# Patient Record
Sex: MALE | Race: WHITE | NOT HISPANIC OR LATINO | Employment: FULL TIME | URBAN - METROPOLITAN AREA
[De-identification: names, ages, dates, MRNs, and addresses within clinical notes are randomized per-mention and may not be internally consistent; named-entity substitution may affect disease eponyms.]

---

## 2017-01-05 ENCOUNTER — OFFICE VISIT (OUTPATIENT)
Dept: CARDIOLOGY | Facility: MEDICAL CENTER | Age: 62
End: 2017-01-05
Payer: COMMERCIAL

## 2017-01-05 VITALS
WEIGHT: 171 LBS | BODY MASS INDEX: 24.48 KG/M2 | SYSTOLIC BLOOD PRESSURE: 110 MMHG | HEART RATE: 80 BPM | HEIGHT: 70 IN | DIASTOLIC BLOOD PRESSURE: 62 MMHG | OXYGEN SATURATION: 92 %

## 2017-01-05 DIAGNOSIS — G47.33 OSA (OBSTRUCTIVE SLEEP APNEA): ICD-10-CM

## 2017-01-05 DIAGNOSIS — I49.9 IRREGULAR HEART BEATS: ICD-10-CM

## 2017-01-05 DIAGNOSIS — R00.2 PALPITATIONS: ICD-10-CM

## 2017-01-05 DIAGNOSIS — E78.5 HYPERLIPIDEMIA, UNSPECIFIED HYPERLIPIDEMIA TYPE: ICD-10-CM

## 2017-01-05 LAB — EKG IMPRESSION: NORMAL

## 2017-01-05 PROCEDURE — 93000 ELECTROCARDIOGRAM COMPLETE: CPT | Performed by: INTERNAL MEDICINE

## 2017-01-05 PROCEDURE — 99204 OFFICE O/P NEW MOD 45 MIN: CPT | Performed by: INTERNAL MEDICINE

## 2017-01-05 NOTE — MR AVS SNAPSHOT
"        Gabriel Cabrera   2017 1:15 PM   Office Visit   MRN: [id number]    Department:  Heart [facility] [place]   Dept Phone:  [phone]    Description:  Male : 1955   Provider:  Mehdi Naik MD,[facility]           Allergies as of 2017     Allergen Noted Reactions    Pcn [Penicillins] 10/24/2012   Rash      You were diagnosed with     Palpitations   [785.1.ICD-9-CM]       Irregular heart beats   [030472]       ERASMO (obstructive sleep apnea)   [257109]   mixed with CSA    Hyperlipidemia, unspecified hyperlipidemia type   [0212887]         Vital Signs     Blood Pressure Pulse Height Weight Body Mass Index Oxygen Saturation    110/62 mmHg 80 1.778 m (5' 10\") 77.565 kg (171 lb) 24.54 kg/m2 92%    Smoking Status                   Never Smoker            Basic Information     Date Of Birth Sex Race Ethnicity Preferred Language    1955 Male White Non- English      Your appointments     2017  2:20 PM   FOLLOW UP with Mehdi Naik MD,[facility] (--)    [address] [place] [address]  [address] [facility] [place] [address]   [phone]              Problem List              ICD-10-CM Priority Class Noted - Resolved    Mixed hyperlipidemia E78.2   2012 - Present    Sleep apnea G47.30   2012 - Present    Other abnormal blood chemistry R79.89   2012 - Present      Health Maintenance        Date Due Completion Dates    COLONOSCOPY 10/1/2005 ---    IMM ZOSTER VACCINE 10/1/2015 ---    IMM INFLUENZA (1) 2016 11/10/2015    IMM DTaP/Tdap/Td Vaccine (2 - Td) 6/10/2025 6/10/2015            Results       Current Immunizations     Influenza Vaccine Quad Inj (Preserved) 11/10/2015    Tdap Vaccine 6/10/2015      Below and/or attached are the medications your provider expects you to take. Review all of your home medications and newly ordered medications with your provider and/or pharmacist. Follow medication instructions as directed by your " provider and/or pharmacist. Please keep your medication list with you and share with your provider. Update the information when medications are discontinued, doses are changed, or new medications (including over-the-counter products) are added; and carry medication information at all times in the event of emergency situations     Allergies:  PCN - Rash               Medications  Valid as of: January 05, 2017 -  1:58 PM    Generic Name Brand Name Tablet Size Instructions for use    Atorvastatin Calcium (Tab) LIPITOR 10 MG Take 1 Tab by mouth every day.        Cholecalciferol   Take  by mouth every day.          Ciclopirox (Solution) PENLAC 8 % Apply daily to affected nail and wipe off with alcohol every 7 days.        Efinaconazole (Solution) Efinaconazole 10 % 1 Application by Apply externally route every day.        Multiple Vitamins-Minerals (Tab) ICAPS  Take  by mouth 2 Times a Day.          NON SPECIFIED   Indications: miles solution for tongue irritation        Red Yeast Rice Extract   Take 1,200 mg by mouth 2 Times a Day.          Saw Palmetto   Take  by mouth every day.          .                 Medicines prescribed today were sent to:     [id number] [address] [address]    Phone: [phone] Fax: [phone]    Open 24 Hours?: No      Medication refill instructions:       If your prescription bottle indicates you have medication refills left, it is not necessary to call your provider’s office. Please contact your pharmacy and they will refill your medication.    If your prescription bottle indicates you do not have any refills left, you may request refills at any time through one of the following ways: The online [employer] system (except Urgent Care), by calling your provider’s office, or by asking your pharmacy to contact your provider’s office with a refill request. Medication refills are processed only during regular business hours  and may not be available until the next business day. Your provider may request additional information or to have a follow-up visit with you prior to refilling your medication.   *Please Note: Medication refills are assigned a new Rx number when refilled electronically. Your pharmacy may indicate that no refills were authorized even though a new prescription for the same medication is available at the pharmacy. Please request the medicine by name with the pharmacy before contacting your provider for a refill.        Your To Do List     Future Labs/Procedures Complete By Expires    ECHOCARDIOGRAM COMP W/O CONT  As directed 1/5/2018    HOLTER MONITOR STUDY  As directed 1/5/2018    TSH  As directed 1/5/2018         [employer] ([employer]) Access Code: [email or web address]  Expires: 2/4/2017  1:58 PM    [employer] (SCTE)  A secure, online tool to manage your health information     [employer]’s [employer] (SCTE)® is a secure, online tool that connects you to your personalized health information from the privacy of your home -- day or night - making it very easy for you to manage your healthcare. Once the activation process is completed, you can even access your medical information using the [employer] (SCTE) makayla, which is available for free in the Apple Makayla store or Google Play store.     [employer] (S[employer]E) provides the following levels of access (as shown below):   My Chart Features   [facility] Doctor [facility]  Specialists [facility]  Urgent  Care [facility]  Primary Care  Doctor   Email your healthcare team securely and privately 24/7 X X X    Manage appointments: schedule your next appointment; view details of past/upcoming appointments X      Request prescription refills. X      View recent personal medical records, including lab and immunizations X X X X   View health record, including health history, allergies, medications X X X X   Read reports about your outpatient visits, procedures, consult and ER notes X X X X   See your discharge summary, which is a recap of  your hospital and/or ER visit that includes your diagnosis, lab results, and care plan. X X       How to register for [employer]:  1. Go to  [email or web address].  2. Click on the Sign Up Now box, which takes you to the New Member Sign Up page. You will need to provide the following information:  a. Enter your [employer] Access Code exactly as it appears at the top of this page. (You will not need to use this code after you’ve completed the sign-up process. If you do not sign up before the expiration date, you must request a new code.)   b. Enter your date of birth.   c. Enter your home email address.   d. Click Submit, and follow the next screen’s instructions.  3. Create a [employer] ID. This will be your [employer] login ID and cannot be changed, so think of one that is secure and easy to remember.  4. Create a [employer] password. You can change your password at any time.  5. Enter your Password Reset Question and Answer. This can be used at a later time if you forget your password.   6. Enter your e-mail address. This allows you to receive e-mail notifications when new information is available in [employer].  7. Click Sign Up. You can now view your health information.    For assistance activating your [employer] account, call [phone]

## 2017-01-05 NOTE — PROGRESS NOTES
Cardiology Consult Note:    Mehdi Naik  Date & Time note created:    1/5/2017   1:43 PM       Patient ID:  Name:             Gabriel Cabrera     YOB: 1955  Age:                 61 y.o.  male   MRN:               [id number]                                                             Chief Complaint: No chief complaint on file.            History of Present Illness:   Gabriel Cabrera has recent fast and irregular heat beats on exertion for 4+ hrs; He registered 140-160 bpm; Still able to function at that HR; Denies other cardiac sx's; High cholesterol and need to increase Statin to high intensity txmt      Review of Systems:     Constitutional: Denies fevers, Denies weight changes  Eyes: Denies changes in vision, no eye pain  Ears/Nose/Throat/Mouth: Denies nasal congestion or sore throat   Cardiovascular: Denies chest pain or palpitations now   Respiratory: Denies shortness of breath , Denies cough  Gastrointestinal/Hepatic: Denies abdominal pain, nausea, vomiting, diarrhea, constipation or GI bleeding   Genitourinary: Denies bladder dysfunction, dysuria or frequency  Musculoskeletal/Rheum: Denies  joint pain and swelling   Skin/Breast: Denies rash, denies breast lumps or discharge  Neurological: Denies headache, confusion, memory loss or focal weakness/parasthesias  Psychiatric: denies mood disorder   Endocrine: Prob MetSyn X; denies hx of diabetes or thyroid dysfunction  Heme/Oncology/Lymph Nodes: Denies enlarged lymph nodes, denies bruising or known bleeding disorder  Allergic/Immunologic: Denies hx of allergies      All other systems were reviewed and are negative (AMA/CMS criteria)    Past Medical History:   Past Medical History   Diagnosis Date   • Skin cancer          Past Surgical History:  History reviewed. No pertinent past surgical history.    Hospital Medications:    Current outpatient prescriptions:   •  atorvastatin (LIPITOR) 10 MG Tab, Take 1 Tab by mouth every day., Disp: 30 Tab,  Rfl: 11  •  SAW PALMETTO, Take  by mouth every day.  , Disp: , Rfl:   •  VITAMIN D, CHOLECALCIFEROL, PO, Take  by mouth every day.  , Disp: , Rfl:   •  Multiple Vitamins-Minerals (ICAPS) TABS, Take  by mouth 2 Times a Day.  , Disp: , Rfl:   •  NON SPECIFIED, Indications: miles solution for tongue irritation, Disp: , Rfl:   •  ciclopirox (PENLAC) 8 % solution, Apply daily to affected nail and wipe off with alcohol every 7 days., Disp: 6.6 mL, Rfl: 3  •  Efinaconazole (JUBLIA) 10 % Solution, 1 Application by Apply externally route every day., Disp: 1 Bottle, Rfl: 1  •  Red Yeast Rice Extract (RED YEAST RICE PO), Take 1,200 mg by mouth 2 Times a Day.  , Disp: , Rfl:     Current Outpatient Medications:  Current Outpatient Prescriptions   Medication Sig Dispense Refill   • atorvastatin (LIPITOR) 10 MG Tab Take 1 Tab by mouth every day. 30 Tab 11   • SAW PALMETTO Take  by mouth every day.       • VITAMIN D, CHOLECALCIFEROL, PO Take  by mouth every day.       • Multiple Vitamins-Minerals (ICAPS) TABS Take  by mouth 2 Times a Day.       • NON SPECIFIED Indications: miles solution for tongue irritation     • ciclopirox (PENLAC) 8 % solution Apply daily to affected nail and wipe off with alcohol every 7 days. 6.6 mL 3   • Efinaconazole (JUBLIA) 10 % Solution 1 Application by Apply externally route every day. 1 Bottle 1   • Red Yeast Rice Extract (RED YEAST RICE PO) Take 1,200 mg by mouth 2 Times a Day.         No current facility-administered medications for this visit.         Medication Allergy/Sensitivities:  Allergies   Allergen Reactions   • Pcn [Penicillins] Rash       Family History:  History reviewed. No pertinent family history.    Social History:  Social History     Social History   • Marital Status:      Spouse Name: N/A   • Number of Children: N/A   • Years of Education: N/A     Occupational History   • Not on file.     Social History Main Topics   • Smoking status: Never Smoker    • Smokeless tobacco:  "Never Used   • Alcohol Use: Yes      Comment: rarely   • Drug Use: No   • Sexual Activity: Not on file     Other Topics Concern   • Not on file     Social History Narrative         Physical Exam:  Vitals  Weight/BMI: Body mass index is 24.54 kg/(m^2).  Blood pressure 110/62, pulse 80, height 1.778 m (5' 10\"), weight 77.565 kg (171 lb), SpO2 92 %.  Filed Vitals:    01/05/17 1323   BP: 110/62   Pulse: 80   Height: 1.778 m (5' 10\")   Weight: 77.565 kg (171 lb)   SpO2: 92%     Oxygen Therapy:  Pulse Oximetry: 92 %  General Appearance:   Well developed, Well nourished, No acute distress, Non-toxic appearance.   HENT:  Normocephalic, Atraumatic, Oropharynx moist mucous membranes, Dentition: Mallampati 4 OP, Nose normal.    Eyes:  PERRLA, EOMI, Conjunctiva normal, No discharge.  Neck:  Normal range of motion, No cervical tenderness, Supple, No stridor, no JVD .  No thyromegaly.  No carotid bruit.  Cardiovascular:  Normal heart rate, Normal rhythm,  S1, S2, no S3,  S4; No gallops; No murmurs, No rubs, .   Extremitites with intact distal pulses, no cyanosis, clubbing or edema.  No heaves, thrills, HJR;  Peripheral pulses: carotid 2+, brachial 2+, radial 2+, ulnar 2+, femoral 2+, popliteal 2+, PT 2+, DP 2+;  Lungs:  Respiratory effort is normal. Normal breath sounds, breath sounds clear to auscultation bilaterally,  no rales, no rhonchi, no wheezing.   Abdomen: Bowel sounds normal, Soft, No tenderness, No guarding, No rebound, No masses, No hepatosplenomegaly.  Skin: Warm, Dry, No erythema, No rash, no induration or crepitus.  Neurologic: Alert & oriented x 3, Normal motor function, Normal sensory function, No focal deficits noted, cranial nerves II through XII are normal,  normal gait.  Psychiatric: Affect normal, Judgment normal, Mood normal.      Data Review:     Records reviewed and summarized in current documentation    Lab Data Review:  Lab Results   Component Value Date/Time    SODIUM 141 11/02/2016 12:00 AM    " POTASSIUM 4.6 11/02/2016 12:00 AM    CHLORIDE 102 11/02/2016 12:00 AM    CO2 22 11/02/2016 12:00 AM    GLUCOSE 96 11/02/2016 12:00 AM    BUN 18 11/02/2016 12:00 AM    CREATININE 0.89 11/02/2016 12:00 AM    BUN-CREATININE RATIO 20 11/02/2016 12:00 AM      No results found for: PROTHROMBTM, INR   Lab Results   Component Value Date/Time    WBC 5.9 11/02/2016 12:00 AM    RBC 4.71 11/02/2016 12:00 AM    HEMOGLOBIN 15.0 11/02/2016 12:00 AM    HEMATOCRIT 45.6 11/02/2016 12:00 AM    MCV 97 11/02/2016 12:00 AM    MCH 31.8 11/02/2016 12:00 AM    MCHC 32.9 11/02/2016 12:00 AM    MPV 10.6* 07/07/2016 02:30 PM    NEUTROPHILS-POLYS 64 11/02/2016 12:00 AM    LYMPHOCYTES 26 11/02/2016 12:00 AM    MONOCYTES 8 11/02/2016 12:00 AM    EOSINOPHILS 2 11/02/2016 12:00 AM    BASOPHILS 0 11/02/2016 12:00 AM        Imaging/Procedures Review:    To my review shows:na    EKG To my review shows: SR 75 bpm, NS IVCD    Assessment and Plan.   61 y.o. male has mixed ERASMO and CSA; not using CPAP--intolerance; Consider AFib as high probability; pls see orders for w/u's;   AHA 10 yr CV risk: 11.8% will use high intensity Statin tx; FLP recheck aims for 50+% LDL reduction from 170    1. Palpitations    - EKG  - HOLTER MONITOR STUDY; Future  - TSH; Future    2. Irregular heart beats    - HOLTER MONITOR STUDY; Future  - TSH; Future    3. ERASMO (obstructive sleep apnea)    - OVERNIGHT PULSE OXIMETRY    4. Hyperlipidemia, unspecified hyperlipidemia type    - LIPID PANEL      1. Palpitations  EKG    HOLTER MONITOR STUDY    TSH   2. Irregular heart beats  HOLTER MONITOR STUDY    TSH   3. ERASMO (obstructive sleep apnea)  OVERNIGHT PULSE OXIMETRY    mixed with CSA   4. Hyperlipidemia, unspecified hyperlipidemia type  LIPID PANEL

## 2017-01-05 NOTE — Clinical Note
[facility]   [address]  [place] [address]  Phone: [phone]  Fax: [phone]              Gabriel Cabrera  1955    Encounter Date: 1/5/2017    Tiffanie Moyer M.D.    Thank you for the referral. I had the pleasure of seeing Gabriel Cabrera today in cardiology clinic. I've attached my visit note below. If you have any questions please feel free to give me a call anytime.      Mehdi Naik MD, PhD, [facility]  Cardiology and Lipidology  [facility]                                                                  PROGRESS NOTE:  Cardiology Consult Note:    Mehdi Naik  Date & Time note created:    1/5/2017   1:43 PM       Patient ID:  Name:             Gabriel Cabrera     YOB: 1955  Age:                 61 y.o.  male   MRN:               [id number]                                                             Chief Complaint: No chief complaint on file.            History of Present Illness:   Gabriel Cabrera has recent fast and irregular heat beats on exertion for 4+ hrs; He registered 140-160 bpm; Still able to function at that HR; Denies other cardiac sx's; High cholesterol and need to increase Statin to high intensity txmt      Review of Systems:     Constitutional: Denies fevers, Denies weight changes  Eyes: Denies changes in vision, no eye pain  Ears/Nose/Throat/Mouth: Denies nasal congestion or sore throat   Cardiovascular: Denies chest pain or palpitations now   Respiratory: Denies shortness of breath , Denies cough  Gastrointestinal/Hepatic: Denies abdominal pain, nausea, vomiting, diarrhea, constipation or GI bleeding   Genitourinary: Denies bladder dysfunction, dysuria or frequency  Musculoskeletal/Rheum: Denies  joint pain and swelling   Skin/Breast: Denies rash, denies breast lumps or discharge  Neurological: Denies headache, confusion, memory loss or focal  weakness/parasthesias  Psychiatric: denies mood disorder   Endocrine: Prob MetSyn X; denies hx of diabetes or thyroid dysfunction  Heme/Oncology/Lymph Nodes: Denies enlarged lymph nodes, denies bruising or known bleeding disorder  Allergic/Immunologic: Denies hx of allergies      All other systems were reviewed and are negative (AMA/CMS criteria)    Past Medical History:   Past Medical History   Diagnosis Date   • Skin cancer          Past Surgical History:  History reviewed. No pertinent past surgical history.    Hospital Medications:    Current outpatient prescriptions:   •  atorvastatin (LIPITOR) 10 MG Tab, Take 1 Tab by mouth every day., Disp: 30 Tab, Rfl: 11  •  SAW PALMETTO, Take  by mouth every day.  , Disp: , Rfl:   •  VITAMIN D, CHOLECALCIFEROL, PO, Take  by mouth every day.  , Disp: , Rfl:   •  Multiple Vitamins-Minerals (ICAPS) TABS, Take  by mouth 2 Times a Day.  , Disp: , Rfl:   •  NON SPECIFIED, Indications: miles solution for tongue irritation, Disp: , Rfl:   •  ciclopirox (PENLAC) 8 % solution, Apply daily to affected nail and wipe off with alcohol every 7 days., Disp: 6.6 mL, Rfl: 3  •  Efinaconazole (JUBLIA) 10 % Solution, 1 Application by Apply externally route every day., Disp: 1 Bottle, Rfl: 1  •  Red Yeast Rice Extract (RED YEAST RICE PO), Take 1,200 mg by mouth 2 Times a Day.  , Disp: , Rfl:     Current Outpatient Medications:  Current Outpatient Prescriptions   Medication Sig Dispense Refill   • atorvastatin (LIPITOR) 10 MG Tab Take 1 Tab by mouth every day. 30 Tab 11   • SAW PALMETTO Take  by mouth every day.       • VITAMIN D, CHOLECALCIFEROL, PO Take  by mouth every day.       • Multiple Vitamins-Minerals (ICAPS) TABS Take  by mouth 2 Times a Day.       • NON SPECIFIED Indications: miles solution for tongue irritation     • ciclopirox (PENLAC) 8 % solution Apply daily to affected nail and wipe off with alcohol every 7 days. 6.6 mL 3   • Efinaconazole (JUBLIA) 10 % Solution 1 Application by  "Apply externally route every day. 1 Bottle 1   • Red Yeast Rice Extract (RED YEAST RICE PO) Take 1,200 mg by mouth 2 Times a Day.         No current facility-administered medications for this visit.         Medication Allergy/Sensitivities:  Allergies   Allergen Reactions   • Pcn [Penicillins] Rash       Family History:  History reviewed. No pertinent family history.    Social History:  Social History     Social History   • Marital Status:      Spouse Name: N/A   • Number of Children: N/A   • Years of Education: N/A     Occupational History   • Not on file.     Social History Main Topics   • Smoking status: Never Smoker    • Smokeless tobacco: Never Used   • Alcohol Use: Yes      Comment: rarely   • Drug Use: No   • Sexual Activity: Not on file     Other Topics Concern   • Not on file     Social History Narrative         Physical Exam:  Vitals  Weight/BMI: Body mass index is 24.54 kg/(m^2).  Blood pressure 110/62, pulse 80, height 1.778 m (5' 10\"), weight 77.565 kg (171 lb), SpO2 92 %.  Filed Vitals:    01/05/17 1323   BP: 110/62   Pulse: 80   Height: 1.778 m (5' 10\")   Weight: 77.565 kg (171 lb)   SpO2: 92%     Oxygen Therapy:  Pulse Oximetry: 92 %  General Appearance:   Well developed, Well nourished, No acute distress, Non-toxic appearance.   HENT:  Normocephalic, Atraumatic, Oropharynx moist mucous membranes, Dentition: Mallampati 4 OP, Nose normal.    Eyes:  PERRLA, EOMI, Conjunctiva normal, No discharge.  Neck:  Normal range of motion, No cervical tenderness, Supple, No stridor, no JVD .  No thyromegaly.  No carotid bruit.  Cardiovascular:  Normal heart rate, Normal rhythm,  S1, S2, no S3,  S4; No gallops; No murmurs, No rubs, .   Extremitites with intact distal pulses, no cyanosis, clubbing or edema.  No heaves, thrills, HJR;  Peripheral pulses: carotid 2+, brachial 2+, radial 2+, ulnar 2+, femoral 2+, popliteal 2+, PT 2+, DP 2+;  Lungs:  Respiratory effort is normal. Normal breath sounds, breath " sounds clear to auscultation bilaterally,  no rales, no rhonchi, no wheezing.   Abdomen: Bowel sounds normal, Soft, No tenderness, No guarding, No rebound, No masses, No hepatosplenomegaly.  Skin: Warm, Dry, No erythema, No rash, no induration or crepitus.  Neurologic: Alert & oriented x 3, Normal motor function, Normal sensory function, No focal deficits noted, cranial nerves II through XII are normal,  normal gait.  Psychiatric: Affect normal, Judgment normal, Mood normal.      Data Review:     Records reviewed and summarized in current documentation    Lab Data Review:  Lab Results   Component Value Date/Time    SODIUM 141 11/02/2016 12:00 AM    POTASSIUM 4.6 11/02/2016 12:00 AM    CHLORIDE 102 11/02/2016 12:00 AM    CO2 22 11/02/2016 12:00 AM    GLUCOSE 96 11/02/2016 12:00 AM    BUN 18 11/02/2016 12:00 AM    CREATININE 0.89 11/02/2016 12:00 AM    BUN-CREATININE RATIO 20 11/02/2016 12:00 AM      No results found for: PROTHROMBTM, INR   Lab Results   Component Value Date/Time    WBC 5.9 11/02/2016 12:00 AM    RBC 4.71 11/02/2016 12:00 AM    HEMOGLOBIN 15.0 11/02/2016 12:00 AM    HEMATOCRIT 45.6 11/02/2016 12:00 AM    MCV 97 11/02/2016 12:00 AM    MCH 31.8 11/02/2016 12:00 AM    MCHC 32.9 11/02/2016 12:00 AM    MPV 10.6* 07/07/2016 02:30 PM    NEUTROPHILS-POLYS 64 11/02/2016 12:00 AM    LYMPHOCYTES 26 11/02/2016 12:00 AM    MONOCYTES 8 11/02/2016 12:00 AM    EOSINOPHILS 2 11/02/2016 12:00 AM    BASOPHILS 0 11/02/2016 12:00 AM        Imaging/Procedures Review:    To my review shows:na    EKG To my review shows: SR 75 bpm, NS IVCD    Assessment and Plan.   61 y.o. male has mixed ERASMO and CSA; not using CPAP--intolerance; Consider AFib as high probability; pls see orders for w/u's;   AHA 10 yr CV risk: 11.8% will use high intensity Statin tx; FLP recheck aims for 50+% LDL reduction from 170    1. Palpitations    - EKG  - HOLTER MONITOR STUDY; Future  - TSH; Future    2. Irregular heart beats    - HOLTER MONITOR STUDY;  Future  - TSH; Future    3. ERASMO (obstructive sleep apnea)    - OVERNIGHT PULSE OXIMETRY    4. Hyperlipidemia, unspecified hyperlipidemia type    - LIPID PANEL      1. Palpitations  EKG    HOLTER MONITOR STUDY    TSH   2. Irregular heart beats  HOLTER MONITOR STUDY    TSH   3. ERASMO (obstructive sleep apnea)  OVERNIGHT PULSE OXIMETRY    mixed with CSA   4. Hyperlipidemia, unspecified hyperlipidemia type  LIPID PANEL         Tiffanie Moyer M.D.  [address]  [place] [address]  VIA Facsimile: [phone]

## 2017-01-06 ENCOUNTER — TELEPHONE (OUTPATIENT)
Dept: CARDIOLOGY | Facility: MEDICAL CENTER | Age: 62
End: 2017-01-06

## 2017-01-17 ENCOUNTER — TELEPHONE (OUTPATIENT)
Dept: CARDIOLOGY | Facility: MEDICAL CENTER | Age: 62
End: 2017-01-17

## 2017-01-17 DIAGNOSIS — E78.2 MIXED HYPERLIPIDEMIA: ICD-10-CM

## 2017-01-17 RX ORDER — ATORVASTATIN CALCIUM 20 MG/1
20 TABLET, FILM COATED ORAL DAILY
Qty: 30 TAB | Refills: 6 | OUTPATIENT
Start: 2017-01-17 | End: 2017-08-23

## 2017-01-17 RX ORDER — ATORVASTATIN CALCIUM 10 MG/1
10 TABLET, FILM COATED ORAL DAILY
Qty: 30 TAB | Refills: 11 | Status: SHIPPED | OUTPATIENT
Start: 2017-01-17 | End: 2017-01-17

## 2017-01-17 NOTE — TELEPHONE ENCOUNTER
To Dr. Naik for review,  Susan MACK RN    ----- Message from Taylor Dhillon sent at 1/17/2017  1:44 PM PST -----  Regarding: questions on medication increase  ERICKA gallagher,    When i spoke with the patient he asked about the increase in his statins.  He said Dr. Naik wanted to increase him to either 30 or 40mg but i can't find that documentation.  Can you please call the patient to clarify.    Thanks.  Natalie

## 2017-01-17 NOTE — TELEPHONE ENCOUNTER
DONE    ----- Message from Mehdi Naik MD,FACC sent at 1/17/2017  6:35 AM PST -----  High ZURDO, OV discussion; Thx  ----- Message -----     From: Susan Angel R.N.     Sent: 1/16/2017   5:06 PM       To: Mehdi Naik MD,FACC    F/V with you 4/18th

## 2017-01-23 ENCOUNTER — TELEPHONE (OUTPATIENT)
Dept: CARDIOLOGY | Facility: MEDICAL CENTER | Age: 62
End: 2017-01-23

## 2017-01-23 NOTE — TELEPHONE ENCOUNTER
----- Message from Lorelei aGrcía, Med Ass't sent at 1/23/2017  3:12 PM PST -----  Regarding: dose clarification  Patient is calling for dose clarifications/directions on his medication (atorvastatin). He can be reached at  [phone] for a call back.       Called and spoke with patient,  He states that he is taking Lipitor 10 mg qd. But was discussed at office visit with Dr. Maria that he would be increasing his statin.    He thinks he may have said increase lipitor to 40 mgs.   Above to Dr. Naik to advise on what dose for him to take.   jlf        Dr. Naik reviews and advises q 3 months if tolerates well with adequate Full lipid panel improvement.  Eventually will try 40-80 mg /day as high intensity.    Advised to have patient get a full lipid panel and come into office for follow-up and discuss.   Left message for patient to call back. jlf      I spoke with Susan Angel RN that did discuss with Dr. Naik on 1/17/17 and was decided that patient increase his Atorvastatin from 10 mg to 20 mg qd.  Patient to repeat Lipid and CMP in 3 months with follow up appt with Dr. Naik in April.   He at that time will discuss increasing the Atorvastatin to 40 or 80 mgs.    Patient states understanding and is thankful for the call and help. jlf

## 2017-01-24 ENCOUNTER — NON-PROVIDER VISIT (OUTPATIENT)
Dept: CARDIOLOGY | Facility: CLINIC | Age: 62
End: 2017-01-24
Payer: COMMERCIAL

## 2017-01-24 DIAGNOSIS — R00.2 PALPITATIONS: ICD-10-CM

## 2017-01-24 DIAGNOSIS — I49.9 IRREGULAR HEART BEAT: ICD-10-CM

## 2017-01-24 DIAGNOSIS — I49.1 PREMATURE ATRIAL CONTRACTION: ICD-10-CM

## 2017-02-03 DIAGNOSIS — R00.2 PALPITATIONS: ICD-10-CM

## 2017-02-03 DIAGNOSIS — I49.9 IRREGULAR HEART BEATS: ICD-10-CM

## 2017-02-03 LAB — EKG IMPRESSION: NORMAL

## 2017-02-03 PROCEDURE — 93224 XTRNL ECG REC UP TO 48 HRS: CPT | Performed by: INTERNAL MEDICINE

## 2017-04-11 LAB
CHOLEST SERPL-MCNC: 170 MG/DL (ref 100–199)
COMMENT 011824: NORMAL
HDLC SERPL-MCNC: 58 MG/DL
LDLC SERPL CALC-MCNC: 95 MG/DL (ref 0–99)
TRIGL SERPL-MCNC: 83 MG/DL (ref 0–149)
TSH SERPL DL<=0.005 MIU/L-ACNC: 1.91 UIU/ML (ref 0.45–4.5)
VLDLC SERPL CALC-MCNC: 17 MG/DL (ref 5–40)

## 2017-04-18 ENCOUNTER — OFFICE VISIT (OUTPATIENT)
Dept: CARDIOLOGY | Facility: CLINIC | Age: 62
End: 2017-04-18
Payer: COMMERCIAL

## 2017-04-18 VITALS
HEART RATE: 88 BPM | DIASTOLIC BLOOD PRESSURE: 70 MMHG | OXYGEN SATURATION: 93 % | SYSTOLIC BLOOD PRESSURE: 110 MMHG | WEIGHT: 163 LBS | BODY MASS INDEX: 23.34 KG/M2 | HEIGHT: 70 IN

## 2017-04-18 DIAGNOSIS — R00.2 PALPITATIONS: ICD-10-CM

## 2017-04-18 DIAGNOSIS — I49.9 IRREGULAR HEART BEATS: ICD-10-CM

## 2017-04-18 DIAGNOSIS — E78.2 MIXED HYPERLIPIDEMIA: ICD-10-CM

## 2017-04-18 PROCEDURE — 99214 OFFICE O/P EST MOD 30 MIN: CPT | Performed by: INTERNAL MEDICINE

## 2017-04-18 NOTE — PROGRESS NOTES
Chief Complaint   Patient presents with   • Follow-Up       This patient is an established male who is here today to discuss:  Recheck palpitation/irreg heart beats; FL:P to lower CV risk on Lipitor 20 mg/d    Patient Active Problem List    Diagnosis Date Noted   • Mixed hyperlipidemia 12/12/2012   • Sleep apnea 12/12/2012   • Other abnormal blood chemistry 12/12/2012       Past Medical History   Diagnosis Date   • Skin cancer      History reviewed. No pertinent past surgical history.    Current Outpatient Prescriptions   Medication Sig Dispense Refill   • atorvastatin (LIPITOR) 10 MG Tab   10   • atorvastatin (LIPITOR) 20 MG Tab Take 1 Tab by mouth every day. 30 Tab 6   • ciclopirox (PENLAC) 8 % solution Apply daily to affected nail and wipe off with alcohol every 7 days. 6.6 mL 3   • SAW PALMETTO Take  by mouth every day.       • VITAMIN D, CHOLECALCIFEROL, PO Take  by mouth every day.       • Multiple Vitamins-Minerals (ICAPS) TABS Take  by mouth 2 Times a Day.       • NON SPECIFIED Take 20 mg by mouth after meals and at bedtime as needed. Indications: miles solution for tongue irritation 90 Each 3   • Efinaconazole (JUBLIA) 10 % Solution 1 Application by Apply externally route every day. 1 Bottle 1   • Red Yeast Rice Extract (RED YEAST RICE PO) Take 1,200 mg by mouth 2 Times a Day.         No current facility-administered medications for this visit.     Pcn      Review of Systems:     Constitutional: Denies fevers, Denies weight changes  Eyes: Denies changes in vision, no eye pain  Ears/Nose/Throat/Mouth: Denies nasal congestion or sore throat   Cardiovascular: Denies chest pain or palpitations   Respiratory: Denies shortness of breath , Denies cough  Gastrointestinal/Hepatic: Denies abdominal pain, nausea, vomiting, diarrhea, constipation or GI bleeding   Genitourinary: Denies bladder dysfunction, dysuria or frequency  Musculoskeletal/Rheum: Denies  joint pain and swelling   Skin/Breast: Denies rash, denies  "breast lumps or discharge  Neurological: Denies headache, confusion, memory loss or focal weakness/parasthesias  Psychiatric: denies mood disorder   Endocrine: denies hx of diabetes or thyroid dysfunction  Heme/Oncology/Lymph Nodes: Denies enlarged lymph nodes, denies brusing or known bleeding disorder  Allergic/Immunologic: Denies hx of allergies      All other systems were reviewed and are negative (AMA/CMS criteria)      Blood pressure 110/70, pulse 88, height 1.778 m (5' 10\"), weight 73.936 kg (163 lb), SpO2 93 %.  General Appearance:   Well developed, Well nourished, No acute distress, Non-toxic appearance.   HENT:  Normocephalic, Atraumatic, Oropharynx moist mucous membranes, Dentition: Mallampati 4 OP, Nose normal.    Eyes:  PERRLA, EOMI, Conjunctiva normal, No discharge.  Neck:  Normal range of motion, No cervical tenderness, Supple, No stridor, no JVD .  No thyromegaly.  No carotid bruit.  Cardiovascular:  Normal heart rate, Normal rhythm,  S1, S2, no S3,  S4; No gallops; No murmurs, No rubs, .   Extremitites with intact distal pulses, no cyanosis, clubbing or edema.  No heaves, thrills, HJR;  Peripheral pulses: carotid 2+, brachial 2+, radial 2+, ulnar 2+, femoral 2+, popliteal 2+, PT 2+, DP 2+;  Lungs:  Respiratory effort is normal. Normal breath sounds, breath sounds clear to auscultation bilaterally,  no rales, no rhonchi, no wheezing.   Abdomen: Bowel sounds normal, Soft, No tenderness, No guarding, No rebound, No masses, No hepatosplenomegaly.  Skin: Warm, Dry, No erythema, No rash, no induration or crepitus.  Neurologic: Alert & oriented x 3, Normal motor function, Normal sensory function, No focal deficits noted, cranial nerves II through XII are normal,  normal gait.  Psychiatric: Affect normal, Judgment normal, Mood normal    Results for WILMA COX (MRN [id number]) as of 4/18/2017 14:34   Ref. Range 7/7/2016 14:30 11/2/2016 00:00 11/23/2016 16:30 1/5/2017 14:21 1/24/2017 13:30 2/7/2017 00:00 " 3/13/2017 14:43 3/13/2017 14:51 4/10/2017 08:40   Glycohemoglobin Unknown   5.6         Cholesterol,Tot Latest Ref Range: 100-199 mg/dL 246 (H) 217 (H)       170   Triglycerides Latest Ref Range: 0-149 mg/dL 61 66       83   HDL Latest Ref Range: >39 mg/dL 64.0 (H) 68       58   Non HDL Cholesterol Latest Ref Range:   182 (H)           LDL Latest Ref Range: 0-99 mg/dL 170 (H) 136 (H)       95   Chol-Hdl Ratio Unknown 3.84           VLDL Cholesterol Calc Latest Ref Range: 5-40 mg/dL  13       17   Comment: Unknown  CANCELED       CANCELED     Assessment and Plan.   61 y.o. male has no AFib/VT on Holter, reassured; FLP has greatly improved, will recheck in 6 months before increase to Lipitor 40 mg/d  OPO discussed w/o sig hypoxemia but ZURDO 18/hr on his stomch position sleeping        1. Palpitations     2. Irregular heart beats     3. Mixed hyperlipidemia

## 2017-04-18 NOTE — MR AVS SNAPSHOT
"        Gabriel Cabrera   2017 2:20 PM   Office Visit   MRN: [id number]    [facility]   Dept Phone:  [phone]    Description:  Male : 1955   Provider:  Mehdi Naik MD,[facility]           Reason for Visit     Follow-Up           Allergies as of 2017     Allergen Noted Reactions    Pcn [Penicillins] 10/24/2012   Rash      You were diagnosed with     Palpitations   [785.1.ICD-9-CM]       Irregular heart beats   [169568]       Mixed hyperlipidemia   [272.2.ICD-9-CM]         Vital Signs     Blood Pressure Pulse Height Weight Body Mass Index Oxygen Saturation    110/70 mmHg 88 1.778 m (5' 10\") 73.936 kg (163 lb) 23.39 kg/m2 93%    Smoking Status                   Never Smoker            Basic Information     Date Of Birth Sex Race Ethnicity Preferred Language    1955 Male White Non- English      Your appointments     Aug 23, 2017  3:00 PM   30 Minute with Tiffanie Moyer M.D.   [facility] (--)    [address] [place] [address] [address] [place] [address]   [phone]            Oct 12, 2017  3:00 PM   FOLLOW UP with [facility] (--)    [address] [place] [address]  2[address] [facility] [address] [phone]              Problem List              ICD-10-CM Priority Class Noted - Resolved    Mixed hyperlipidemia E78.2   2012 - Present    Sleep apnea G47.30   2012 - Present    Other abnormal blood chemistry R79.89   2012 - Present      Health Maintenance        Date Due Completion Dates    COLONOSCOPY 10/1/2005 ---    IMM ZOSTER VACCINE 10/1/2015 ---    IMM DTaP/Tdap/Td Vaccine (2 - Td) 6/10/2025 6/10/2015            Current Immunizations     Influenza Vaccine Quad Inj (Preserved) 11/10/2015    Tdap Vaccine 6/10/2015      Below and/or attached are the medications your provider expects you to take. Review all of your home medications and newly ordered medications with your provider and/or " pharmacist. Follow medication instructions as directed by your provider and/or pharmacist. Please keep your medication list with you and share with your provider. Update the information when medications are discontinued, doses are changed, or new medications (including over-the-counter products) are added; and carry medication information at all times in the event of emergency situations     Allergies:  PCN - Rash               Medications  Valid as of: April 18, 2017 -  2:51 PM    Generic Name Brand Name Tablet Size Instructions for use    Atorvastatin Calcium (Tab) LIPITOR 20 MG Take 1 Tab by mouth every day.        Atorvastatin Calcium (Tab) LIPITOR 10 MG         Cholecalciferol   Take  by mouth every day.          Ciclopirox (Solution) PENLAC 8 % Apply daily to affected nail and wipe off with alcohol every 7 days.        Efinaconazole (Solution) Efinaconazole 10 % 1 Application by Apply externally route every day.        Multiple Vitamins-Minerals (Tab) ICAPS  Take  by mouth 2 Times a Day.          NON SPECIFIED   Take 20 mg by mouth after meals and at bedtime as needed. Indications: miles solution for tongue irritation        Red Yeast Rice Extract   Take 1,200 mg by mouth 2 Times a Day.          Saw Palmetto   Take  by mouth every day.          .                 Medicines prescribed today were sent to:     [facility] [address] [address]    Phone: [phone] Fax: [phone]    Open 24 Hours?: No      Medication refill instructions:       If your prescription bottle indicates you have medication refills left, it is not necessary to call your provider’s office. Please contact your pharmacy and they will refill your medication.    If your prescription bottle indicates you do not have any refills left, you may request refills at any time through one of the following ways: The online [employer] system (except Urgent Care), by calling your  provider’s office, or by asking your pharmacy to contact your provider’s office with a refill request. Medication refills are processed only during regular business hours and may not be available until the next business day. Your provider may request additional information or to have a follow-up visit with you prior to refilling your medication.   *Please Note: Medication refills are assigned a new Rx number when refilled electronically. Your pharmacy may indicate that no refills were authorized even though a new prescription for the same medication is available at the pharmacy. Please request the medicine by name with the pharmacy before contacting your provider for a refill.           Scaffold Access Code: [email or web address]  Expires: 5/18/2017  2:51 PM    Scaffold  A secure, online tool to manage your health information     [employer]’s Scaffold® is a secure, online tool that connects you to your personalized health information from the privacy of your home -- day or night - making it very easy for you to manage your healthcare. Once the activation process is completed, you can even access your medical information using the Scaffold makayla, which is available for free in the Apple Mak[employer] store or Google Play store.     Scaffold provides the following levels of access (as shown below):   My Chart Features   [facility] Doctor [facility]  Specialists [facility]  Urgent  Care Non-[facility]  Primary Care  Doctor   Email your healthcare team securely and privately 24/7 X X X    Manage appointments: schedule your next appointment; view details of past/upcoming appointments X      Request prescription refills. X      View recent personal medical records, including lab and immunizations X X X X   View health record, including health history, allergies, medications X X X X   Read reports about your outpatient visits, procedures, consult and ER notes X X X X   See your discharge summary, which is a recap of your hospital and/or ER  visit that includes your diagnosis, lab results, and care plan. X X       How to register for [facility]:  1. Go to  [email or web address].  2. Click on the Sign Up Now box, which takes you to the New Member Sign Up page. You will need to provide the following information:  a. Enter your [facility] Access Code exactly as it appears at the top of this page. (You will not need to use this code after you’ve completed the sign-up process. If you do not sign up before the expiration date, you must request a new code.)   b. Enter your date of birth.   c. Enter your home email address.   d. Click Submit, and follow the next screen’s instructions.  3. Create a [employer] ID. This will be your [employer] login ID and cannot be changed, so think of one that is secure and easy to remember.  4. Create a [employer] password. You can change your password at any time.  5. Enter your Password Reset Question and Answer. This can be used at a later time if you forget your password.   6. Enter your e-mail address. This allows you to receive e-mail notifications when new information is available in [facility].  7. Click Sign Up. You can now view your health information.    For assistance activating your [facility] account, call [phone]

## 2017-04-18 NOTE — Clinical Note
[facility] [address],   [address]  [place] [address]  Phone: [phone]  Fax: [phone]              Gabriel Cabrera  1955    Encounter Date: 4/18/2017    Tiffanie Moyer M.D.    Thank you for the referral. I had the pleasure of seeing Gabriel Cabrera today in cardiology clinic. I've attached my visit note below. If you have any questions please feel free to give me a call anytime.      Mehdi Naik MD, PhD, [facility]  Cardiology and Lipidology  [facility]                                                                  PROGRESS NOTE:  Chief Complaint   Patient presents with   • Follow-Up       This patient is an established male who is here today to discuss:  Recheck palpitation/irreg heart beats; FL:P to lower CV risk on Lipitor 20 mg/d    Patient Active Problem List    Diagnosis Date Noted   • Mixed hyperlipidemia 12/12/2012   • Sleep apnea 12/12/2012   • Other abnormal blood chemistry 12/12/2012       Past Medical History   Diagnosis Date   • Skin cancer      History reviewed. No pertinent past surgical history.    Current Outpatient Prescriptions   Medication Sig Dispense Refill   • atorvastatin (LIPITOR) 10 MG Tab   10   • atorvastatin (LIPITOR) 20 MG Tab Take 1 Tab by mouth every day. 30 Tab 6   • ciclopirox (PENLAC) 8 % solution Apply daily to affected nail and wipe off with alcohol every 7 days. 6.6 mL 3   • SAW PALMETTO Take  by mouth every day.       • VITAMIN D, CHOLECALCIFEROL, PO Take  by mouth every day.       • Multiple Vitamins-Minerals (ICAPS) TABS Take  by mouth 2 Times a Day.       • NON SPECIFIED Take 20 mg by mouth after meals and at bedtime as needed. Indications: miles solution for tongue irritation 90 Each 3   • Efinaconazole (JUBLIA) 10 % Solution 1 Application by Apply externally route every day. 1 Bottle 1   • Red Yeast Rice Extract (RED YEAST RICE PO) Take 1,200 mg by  "mouth 2 Times a Day.         No current facility-administered medications for this visit.     Pcn      Review of Systems:     Constitutional: Denies fevers, Denies weight changes  Eyes: Denies changes in vision, no eye pain  Ears/Nose/Throat/Mouth: Denies nasal congestion or sore throat   Cardiovascular: Denies chest pain or palpitations   Respiratory: Denies shortness of breath , Denies cough  Gastrointestinal/Hepatic: Denies abdominal pain, nausea, vomiting, diarrhea, constipation or GI bleeding   Genitourinary: Denies bladder dysfunction, dysuria or frequency  Musculoskeletal/Rheum: Denies  joint pain and swelling   Skin/Breast: Denies rash, denies breast lumps or discharge  Neurological: Denies headache, confusion, memory loss or focal weakness/parasthesias  Psychiatric: denies mood disorder   Endocrine: denies hx of diabetes or thyroid dysfunction  Heme/Oncology/Lymph Nodes: Denies enlarged lymph nodes, denies brusing or known bleeding disorder  Allergic/Immunologic: Denies hx of allergies      All other systems were reviewed and are negative (AMA/CMS criteria)      Blood pressure 110/70, pulse 88, height 1.778 m (5' 10\"), weight 73.936 kg (163 lb), SpO2 93 %.  General Appearance:   Well developed, Well nourished, No acute distress, Non-toxic appearance.   HENT:  Normocephalic, Atraumatic, Oropharynx moist mucous membranes, Dentition: Mallampati 4 OP, Nose normal.    Eyes:  PERRLA, EOMI, Conjunctiva normal, No discharge.  Neck:  Normal range of motion, No cervical tenderness, Supple, No stridor, no JVD .  No thyromegaly.  No carotid bruit.  Cardiovascular:  Normal heart rate, Normal rhythm,  S1, S2, no S3,  S4; No gallops; No murmurs, No rubs, .   Extremitites with intact distal pulses, no cyanosis, clubbing or edema.  No heaves, thrills, HJR;  Peripheral pulses: carotid 2+, brachial 2+, radial 2+, ulnar 2+, femoral 2+, popliteal 2+, PT 2+, DP 2+;  Lungs:  Respiratory effort is normal. Normal breath sounds, " breath sounds clear to auscultation bilaterally,  no rales, no rhonchi, no wheezing.   Abdomen: Bowel sounds normal, Soft, No tenderness, No guarding, No rebound, No masses, No hepatosplenomegaly.  Skin: Warm, Dry, No erythema, No rash, no induration or crepitus.  Neurologic: Alert & oriented x 3, Normal motor function, Normal sensory function, No focal deficits noted, cranial nerves II through XII are normal,  normal gait.  Psychiatric: Affect normal, Judgment normal, Mood normal    Results for WILMA COX (MRN [id number]) as of 4/18/2017 14:34   Ref. Range 7/7/2016 14:30 11/2/2016 00:00 11/23/2016 16:30 1/5/2017 14:21 1/24/2017 13:30 2/7/2017 00:00 3/13/2017 14:43 3/13/2017 14:51 4/10/2017 08:40   Glycohemoglobin Unknown   5.6         Cholesterol,Tot Latest Ref Range: 100-199 mg/dL 246 (H) 217 (H)       170   Triglycerides Latest Ref Range: 0-149 mg/dL 61 66       83   HDL Latest Ref Range: >39 mg/dL 64.0 (H) 68       58   Non HDL Cholesterol Latest Ref Range:   182 (H)           LDL Latest Ref Range: 0-99 mg/dL 170 (H) 136 (H)       95   Chol-Hdl Ratio Unknown 3.84           VLDL Cholesterol Calc Latest Ref Range: 5-40 mg/dL  13       17   Comment: Unknown  CANCELED       CANCELED     Assessment and Plan.   61 y.o. male has no AFib/VT on Holter, reassured; FLP has greatly improved, will recheck in 6 months before increase to Lipitor 40 mg/d  OPO discussed w/o sig hypoxemia but ZURDO 18/hr on his stomch position sleeping        1. Palpitations     2. Irregular heart beats     3. Mixed hyperlipidemia               Tiffanie Moyer M.D.  [address] [facility] [address]  [place] [address]  VIA Facsimile: [phone]

## 2017-08-15 PROBLEM — R97.20 ELEVATED PROSTATE SPECIFIC ANTIGEN (PSA): Status: ACTIVE | Noted: 2017-08-15

## 2017-10-13 LAB
CHOLEST SERPL-MCNC: 183 MG/DL (ref 100–199)
COMMENT 011824: ABNORMAL
HDLC SERPL-MCNC: 57 MG/DL
LDLC SERPL CALC-MCNC: 110 MG/DL (ref 0–99)
TRIGL SERPL-MCNC: 81 MG/DL (ref 0–149)
VLDLC SERPL CALC-MCNC: 16 MG/DL (ref 5–40)

## 2017-10-19 ENCOUNTER — OFFICE VISIT (OUTPATIENT)
Dept: CARDIOLOGY | Facility: CLINIC | Age: 62
End: 2017-10-19
Payer: COMMERCIAL

## 2017-10-19 VITALS
OXYGEN SATURATION: 94 % | WEIGHT: 166 LBS | BODY MASS INDEX: 23.77 KG/M2 | SYSTOLIC BLOOD PRESSURE: 110 MMHG | HEART RATE: 70 BPM | DIASTOLIC BLOOD PRESSURE: 60 MMHG | HEIGHT: 70 IN

## 2017-10-19 DIAGNOSIS — G47.33 OSA (OBSTRUCTIVE SLEEP APNEA): ICD-10-CM

## 2017-10-19 DIAGNOSIS — I49.9 IRREGULAR HEART BEATS: ICD-10-CM

## 2017-10-19 DIAGNOSIS — E78.2 MIXED HYPERLIPIDEMIA: ICD-10-CM

## 2017-10-19 DIAGNOSIS — R00.2 PALPITATIONS: ICD-10-CM

## 2017-10-19 PROCEDURE — 99214 OFFICE O/P EST MOD 30 MIN: CPT | Performed by: INTERNAL MEDICINE

## 2017-10-19 RX ORDER — MULTIVIT WITH MINERALS/LUTEIN
1 TABLET ORAL DAILY
COMMUNITY
End: 2021-03-03

## 2017-10-19 RX ORDER — UBIDECARENONE 75 MG
5000 CAPSULE ORAL DAILY
COMMUNITY

## 2017-10-19 RX ORDER — ASCORBIC ACID 500 MG
500 TABLET ORAL DAILY
COMMUNITY
End: 2022-11-17

## 2017-10-19 RX ORDER — ATORVASTATIN CALCIUM 40 MG/1
40 TABLET, FILM COATED ORAL DAILY
Qty: 90 TAB | Refills: 3 | Status: SHIPPED | OUTPATIENT
Start: 2017-10-19 | End: 2018-11-06 | Stop reason: SDUPTHER

## 2017-10-19 NOTE — PROGRESS NOTES
Chief Complaint   Patient presents with   • Follow-Up     Palpitation, HLD    This patient is an established male who is here today to discuss:  Palpitation/irreg, irreg  heart beats; FL:P to lower CV risk on Lipitor 20 mg/d    Patient Active Problem List    Diagnosis Date Noted   • Elevated prostate specific antigen (PSA) 08/15/2017   • Mixed hyperlipidemia 12/12/2012   • Sleep apnea 12/12/2012   • Other abnormal blood chemistry 12/12/2012       Past Medical History:   Diagnosis Date   • Skin cancer      No past surgical history on file.  Social History     Social History   • Marital status:      Spouse name: N/A   • Number of children: N/A   • Years of education: N/A     Social History Main Topics   • Smoking status: Never Smoker   • Smokeless tobacco: Never Used   • Alcohol use 0.0 oz/week      Comment: rarely   • Drug use: No   • Sexual activity: Not on file     Other Topics Concern   • Not on file     Social History Narrative   • No narrative on file     No family history on file.    Current Outpatient Prescriptions   Medication Sig Dispense Refill   • atorvastatin (LIPITOR) 20 MG Tab TAKE ONE TABLET BY MOUTH ONCE DAILY 30 Tab 6   • ciclopirox (PENLAC) 8 % solution Apply daily to affected nail and wipe off with alcohol every 7 days. 6.6 mL 3   • Red Yeast Rice Extract (RED YEAST RICE PO) Take 1,200 mg by mouth 2 Times a Day.       • SAW PALMETTO Take  by mouth every day.       • VITAMIN D, CHOLECALCIFEROL, PO Take  by mouth every day.       • Multiple Vitamins-Minerals (ICAPS) TABS Take  by mouth 2 Times a Day.         No current facility-administered medications for this visit.      Pcn [penicillins]    Review of Systems:     Constitutional: Denies fevers, Denies weight changes  Eyes: Denies changes in vision, no eye pain  Ears/Nose/Throat/Mouth: Denies nasal congestion or sore throat   Cardiovascular: Denies chest pain but palpitations   Respiratory: Denies shortness of breath , Denies  "cough  Gastrointestinal/Hepatic: Denies abdominal pain, nausea, vomiting, diarrhea, constipation or GI bleeding   Genitourinary: Denies bladder dysfunction, dysuria or frequency  Musculoskeletal/Rheum: Denies  joint pain and swelling   Skin/Breast: Denies rash, denies breast lumps or discharge  Neurological: Denies headache, confusion, memory loss or focal weakness/parasthesias  Psychiatric: denies mood disorder   Endocrine: denies hx of diabetes or thyroid dysfunction  Heme/Oncology/Lymph Nodes: Denies enlarged lymph nodes, denies brusing or known bleeding disorder  Allergic/Immunologic:  hx of allergies      All other systems were reviewed and are negative (AMA/CMS criteria)      Blood pressure 110/60, pulse 70, height 1.778 m (5' 10\"), weight 75.3 kg (166 lb), SpO2 94 %.  General Appearance:   Well developed, Well nourished, No acute distress, Non-toxic appearance.   HENT:  Normocephalic, Atraumatic, Oropharynx moist mucous membranes, Dentition: Mallampati 4 OP, Nose normal.    Eyes:  PERRLA, EOMI, Conjunctiva normal, No discharge.  Neck:  Normal range of motion, No cervical tenderness, Supple, No stridor, no JVD .  No thyromegaly.  No carotid bruit.  Cardiovascular:  Normal heart rate, Normal rhythm,  S1, S2, no S3,  S4; No gallops; No murmurs, No rubs, .   Extremitites with intact distal pulses, no cyanosis, clubbing or edema.  No heaves, thrills, HJR;  Peripheral pulses: carotid 2+, brachial 2+, radial 2+, ulnar 2+, femoral 2+, popliteal 2+, PT 2+, DP 2+;  Lungs:  Respiratory effort is normal. Normal breath sounds, breath sounds clear to auscultation bilaterally,  no rales, no rhonchi, no wheezing.   Abdomen: Bowel sounds normal, Soft, No tenderness, No guarding, No rebound, No masses, No hepatosplenomegaly.  Skin: Warm, Dry, No erythema, No rash, no induration or crepitus.  Neurologic: Alert & oriented x 3, Normal motor function, Normal sensory function, No focal deficits noted, cranial nerves II through XII " are normal,  normal gait.  Psychiatric: Affect normal, Judgment normal, Mood normal      Results for WILMA COX (MRN [id number]) as of 10/19/2017 14:56   Ref. Range 4/10/2017 08:40 8/18/2017 10:34 10/13/2017 08:33   WBC Latest Ref Range: 3.4 - 10.8 x10E3/uL  5.4    RBC Latest Ref Range: 4.14 - 5.80 x10E6/uL  4.41    Hemoglobin Latest Ref Range: 12.6 - 17.7 g/dL  13.9    Hematocrit Latest Ref Range: 37.5 - 51.0 %  42.6    MCV Latest Ref Range: 79 - 97 fL  97    MCH Latest Ref Range: 26.6 - 33.0 pg  31.5    MCHC Latest Ref Range: 31.5 - 35.7 g/dL  32.6    RDW Latest Ref Range: 12.3 - 15.4 %  13.2    Platelet Count Latest Ref Range: 150 - 379 x10E3/uL  211    Immature Cells Unknown  CANCELED    Neutrophils-Polys Latest Units: %  67    Neutrophils (Absolute) Latest Ref Range: 1.4 - 7.0 x10E3/uL  3.6    Lymphocytes Latest Units: %  25    Lymphs (Absolute) Latest Ref Range: 0.7 - 3.1 x10E3/uL  1.4    Monocytes Latest Units: %  6    Monos (Absolute) Latest Ref Range: 0.1 - 0.9 x10E3/uL  0.4    Eosinophils Latest Units: %  2    Eos (Absolute) Latest Ref Range: 0.0 - 0.4 x10E3/uL  0.1    Basophils Latest Units: %  0    Baso (Absolute) Latest Ref Range: 0.0 - 0.2 x10E3/uL  0.0    Immature Granulocytes Latest Units: %  0    Immature Granulocytes (abs) Latest Ref Range: 0.0 - 0.1 x10E3/uL  0.0    Nucleated RBC Unknown  CANCELED    Comments-Diff Unknown  CANCELED    Sodium Latest Ref Range: 134 - 144 mmol/L  139    Potassium Latest Ref Range: 3.5 - 5.2 mmol/L  4.2    Chloride Latest Ref Range: 96 - 106 mmol/L  103    Co2 Latest Ref Range: 18 - 29 mmol/L  20    Glucose Latest Ref Range: 65 - 99 mg/dL  90    Bun Latest Ref Range: 8 - 27 mg/dL  23    Creatinine Latest Ref Range: 0.76 - 1.27 mg/dL  0.94    GFR If  Latest Ref Range: >59 mL/min/1.73  101    GFR If Non  Latest Ref Range: >59 mL/min/1.73  87    Bun-Creatinine Ratio Latest Ref Range: 10 - 24   24    Calcium Latest Ref Range: 8.6 -  10.2 mg/dL  9.2    AST(SGOT) Latest Ref Range: 0 - 40 IU/L  14    ALT(SGPT) Latest Ref Range: 0 - 44 IU/L  17    Alkaline Phosphatase Latest Ref Range: 39 - 117 IU/L  69    Total Bilirubin Latest Ref Range: 0.0 - 1.2 mg/dL  0.4    Albumin Latest Ref Range: 3.6 - 4.8 g/dL  4.3    Total Protein Latest Ref Range: 6.0 - 8.5 g/dL  6.9    Globulin Latest Ref Range: 1.5 - 4.5 g/dL  2.6    A-G Ratio Latest Ref Range: 1.2 - 2.2   1.7    Cholesterol,Tot Latest Ref Range: 100 - 199 mg/dL 170 185 183   Triglycerides Latest Ref Range: 0 - 149 mg/dL 83 56 81   HDL Latest Ref Range: >39 mg/dL 58 59 57   LDL Latest Ref Range: 0 - 99 mg/dL 95 115 (H) 110 (H)   Chol-Hdl Ratio Latest Ref Range: 0.0 - 5.0 ratio units  3.1    VLDL Cholesterol Calc Latest Ref Range: 5 - 40 mg/dL 17 11 16   Results for WILMA COX (MRN [id number]) as of 10/19/2017 14:56   Ref. Range 3/13/2017 14:51 4/10/2017 08:40 8/18/2017 10:34   Prostatic Specific Antigen Tot Latest Ref Range: 0.0 - 4.0 ng/mL   5.9 (H)   25-Hydroxy   Vitamin D 25 Latest Ref Range: 30.0 - 100.0 ng/mL   30.1   TSH Latest Ref Range: 0.450 - 4.500 uIU/mL  1.910 2.070     Assessment and Plan.   62 y.o. male has no SE from Lipitor and LDL does not seem to respond ; Will increase Lipitor to 40 mg/d and recheck before consider different Statin prep; He is no longer taking Red yeast rice;     1. Palpitations  none    2. Irregular heart beats  none    3. Mixed hyperlipidemia  See above    4. ERASMO (obstructive sleep apnea)  None O2 nor CPAP;       Return to clinic in  4 , months    1. Palpitations     2. Irregular heart beats     3. Mixed hyperlipidemia     4. ERASMO (obstructive sleep apnea)

## 2017-10-19 NOTE — LETTER
[facility] [address],   [address]  [place] [address]  Phone: [phone]  Fax: [phone]              Gabriel Cabrera  1955    Encounter Date: 10/19/2017    Tiffanie Moyer M.D.    Thank you for the referral. I had the pleasure of seeing Gabriel Cabrera today in cardiology clinic. I've attached my visit note below. If you have any questions please feel free to give me a call anytime.      Mehdi Naik MD, PhD, [facility]  Cardiology and Lipidology  [facility]                                                                  PROGRESS NOTE:  Chief Complaint   Patient presents with   • Follow-Up     Palpitation, HLD    This patient is an established male who is here today to discuss:  Palpitation/irreg, irreg  heart beats; FL:P to lower CV risk on Lipitor 20 mg/d    Patient Active Problem List    Diagnosis Date Noted   • Elevated prostate specific antigen (PSA) 08/15/2017   • Mixed hyperlipidemia 12/12/2012   • Sleep apnea 12/12/2012   • Other abnormal blood chemistry 12/12/2012       Past Medical History:   Diagnosis Date   • Skin cancer      No past surgical history on file.  Social History     Social History   • Marital status:      Spouse name: N/A   • Number of children: N/A   • Years of education: N/A     Social History Main Topics   • Smoking status: Never Smoker   • Smokeless tobacco: Never Used   • Alcohol use 0.0 oz/week      Comment: rarely   • Drug use: No   • Sexual activity: Not on file     Other Topics Concern   • Not on file     Social History Narrative   • No narrative on file     No family history on file.    Current Outpatient Prescriptions   Medication Sig Dispense Refill   • atorvastatin (LIPITOR) 20 MG Tab TAKE ONE TABLET BY MOUTH ONCE DAILY 30 Tab 6   • ciclopirox (PENLAC) 8 % solution Apply daily to affected nail and wipe off with alcohol every 7 days. 6.6 mL 3   • Red  "Yeast Rice Extract (RED YEAST RICE PO) Take 1,200 mg by mouth 2 Times a Day.       • SAW PALMETTO Take  by mouth every day.       • VITAMIN D, CHOLECALCIFEROL, PO Take  by mouth every day.       • Multiple Vitamins-Minerals (ICAPS) TABS Take  by mouth 2 Times a Day.         No current facility-administered medications for this visit.      Pcn [penicillins]    Review of Systems:     Constitutional: Denies fevers, Denies weight changes  Eyes: Denies changes in vision, no eye pain  Ears/Nose/Throat/Mouth: Denies nasal congestion or sore throat   Cardiovascular: Denies chest pain but palpitations   Respiratory: Denies shortness of breath , Denies cough  Gastrointestinal/Hepatic: Denies abdominal pain, nausea, vomiting, diarrhea, constipation or GI bleeding   Genitourinary: Denies bladder dysfunction, dysuria or frequency  Musculoskeletal/Rheum: Denies  joint pain and swelling   Skin/Breast: Denies rash, denies breast lumps or discharge  Neurological: Denies headache, confusion, memory loss or focal weakness/parasthesias  Psychiatric: denies mood disorder   Endocrine: denies hx of diabetes or thyroid dysfunction  Heme/Oncology/Lymph Nodes: Denies enlarged lymph nodes, denies brusing or known bleeding disorder  Allergic/Immunologic:  hx of allergies      All other systems were reviewed and are negative (AMA/CMS criteria)      Blood pressure 110/60, pulse 70, height 1.778 m (5' 10\"), weight 75.3 kg (166 lb), SpO2 94 %.  General Appearance:   Well developed, Well nourished, No acute distress, Non-toxic appearance.   HENT:  Normocephalic, Atraumatic, Oropharynx moist mucous membranes, Dentition: Mallampati 4 OP, Nose normal.    Eyes:  PERRLA, EOMI, Conjunctiva normal, No discharge.  Neck:  Normal range of motion, No cervical tenderness, Supple, No stridor, no JVD .  No thyromegaly.  No carotid bruit.  Cardiovascular:  Normal heart rate, Normal rhythm,  S1, S2, no S3,  S4; No gallops; No murmurs, No rubs, .   Extremitites " with intact distal pulses, no cyanosis, clubbing or edema.  No heaves, thrills, HJR;  Peripheral pulses: carotid 2+, brachial 2+, radial 2+, ulnar 2+, femoral 2+, popliteal 2+, PT 2+, DP 2+;  Lungs:  Respiratory effort is normal. Normal breath sounds, breath sounds clear to auscultation bilaterally,  no rales, no rhonchi, no wheezing.   Abdomen: Bowel sounds normal, Soft, No tenderness, No guarding, No rebound, No masses, No hepatosplenomegaly.  Skin: Warm, Dry, No erythema, No rash, no induration or crepitus.  Neurologic: Alert & oriented x 3, Normal motor function, Normal sensory function, No focal deficits noted, cranial nerves II through XII are normal,  normal gait.  Psychiatric: Affect normal, Judgment normal, Mood normal      Results for WILMA COX (MRN [id number]) as of 10/19/2017 14:56   Ref. Range 4/10/2017 08:40 8/18/2017 10:34 10/13/2017 08:33   WBC Latest Ref Range: 3.4 - 10.8 x10E3/uL  5.4    RBC Latest Ref Range: 4.14 - 5.80 x10E6/uL  4.41    Hemoglobin Latest Ref Range: 12.6 - 17.7 g/dL  13.9    Hematocrit Latest Ref Range: 37.5 - 51.0 %  42.6    MCV Latest Ref Range: 79 - 97 fL  97    MCH Latest Ref Range: 26.6 - 33.0 pg  31.5    MCHC Latest Ref Range: 31.5 - 35.7 g/dL  32.6    RDW Latest Ref Range: 12.3 - 15.4 %  13.2    Platelet Count Latest Ref Range: 150 - 379 x10E3/uL  211    Immature Cells Unknown  CANCELED    Neutrophils-Polys Latest Units: %  67    Neutrophils (Absolute) Latest Ref Range: 1.4 - 7.0 x10E3/uL  3.6    Lymphocytes Latest Units: %  25    Lymphs (Absolute) Latest Ref Range: 0.7 - 3.1 x10E3/uL  1.4    Monocytes Latest Units: %  6    Monos (Absolute) Latest Ref Range: 0.1 - 0.9 x10E3/uL  0.4    Eosinophils Latest Units: %  2    Eos (Absolute) Latest Ref Range: 0.0 - 0.4 x10E3/uL  0.1    Basophils Latest Units: %  0    Baso (Absolute) Latest Ref Range: 0.0 - 0.2 x10E3/uL  0.0    Immature Granulocytes Latest Units: %  0    Immature Granulocytes (abs) Latest Ref Range: 0.0 - 0.1  x10E3/uL  0.0    Nucleated RBC Unknown  CANCELED    Comments-Diff Unknown  CANCELED    Sodium Latest Ref Range: 134 - 144 mmol/L  139    Potassium Latest Ref Range: 3.5 - 5.2 mmol/L  4.2    Chloride Latest Ref Range: 96 - 106 mmol/L  103    Co2 Latest Ref Range: 18 - 29 mmol/L  20    Glucose Latest Ref Range: 65 - 99 mg/dL  90    Bun Latest Ref Range: 8 - 27 mg/dL  23    Creatinine Latest Ref Range: 0.76 - 1.27 mg/dL  0.94    GFR If  Latest Ref Range: >59 mL/min/1.73  101    GFR If Non  Latest Ref Range: >59 mL/min/1.73  87    Bun-Creatinine Ratio Latest Ref Range: 10 - 24   24    Calcium Latest Ref Range: 8.6 - 10.2 mg/dL  9.2    AST(SGOT) Latest Ref Range: 0 - 40 IU/L  14    ALT(SGPT) Latest Ref Range: 0 - 44 IU/L  17    Alkaline Phosphatase Latest Ref Range: 39 - 117 IU/L  69    Total Bilirubin Latest Ref Range: 0.0 - 1.2 mg/dL  0.4    Albumin Latest Ref Range: 3.6 - 4.8 g/dL  4.3    Total Protein Latest Ref Range: 6.0 - 8.5 g/dL  6.9    Globulin Latest Ref Range: 1.5 - 4.5 g/dL  2.6    A-G Ratio Latest Ref Range: 1.2 - 2.2   1.7    Cholesterol,Tot Latest Ref Range: 100 - 199 mg/dL 170 185 183   Triglycerides Latest Ref Range: 0 - 149 mg/dL 83 56 81   HDL Latest Ref Range: >39 mg/dL 58 59 57   LDL Latest Ref Range: 0 - 99 mg/dL 95 115 (H) 110 (H)   Chol-Hdl Ratio Latest Ref Range: 0.0 - 5.0 ratio units  3.1    VLDL Cholesterol Calc Latest Ref Range: 5 - 40 mg/dL 17 11 16   Results for WILMA COX (MRN [id number]) as of 10/19/2017 14:56   Ref. Range 3/13/2017 14:51 4/10/2017 08:40 8/18/2017 10:34   Prostatic Specific Antigen Tot Latest Ref Range: 0.0 - 4.0 ng/mL   5.9 (H)   25-Hydroxy   Vitamin D 25 Latest Ref Range: 30.0 - 100.0 ng/mL   30.1   TSH Latest Ref Range: 0.450 - 4.500 uIU/mL  1.910 2.070     Assessment and Plan.   62 y.o. male has no SE from Lipitor and LDL does not seem to respond ; Will increase Lipitor to 40 mg/d and recheck before consider different Statin prep;  He is no longer taking Red yeast rice;     1. Palpitations  none    2. Irregular heart beats  none    3. Mixed hyperlipidemia  See above    4. ERASMO (obstructive sleep apnea)  None O2 nor CPAP;       Return to clinic in  4 , months    1. Palpitations     2. Irregular heart beats     3. Mixed hyperlipidemia     4. ERASMO (obstructive sleep apnea)           Tiffanie Moyer M.D.  [address] [facility] [address]  [place] [address]  VIA Facsimile: [phone]

## 2018-11-06 DIAGNOSIS — E78.2 MIXED HYPERLIPIDEMIA: Primary | ICD-10-CM

## 2018-11-07 RX ORDER — ATORVASTATIN CALCIUM 40 MG/1
40 TABLET, FILM COATED ORAL DAILY
Qty: 90 TAB | Refills: 0 | Status: SHIPPED | OUTPATIENT
Start: 2018-11-07 | End: 2018-12-18 | Stop reason: SDUPTHER

## 2019-06-26 PROBLEM — Z15.01 BRCA2 GENE MUTATION POSITIVE IN MALE: Status: ACTIVE | Noted: 2019-06-26

## 2019-06-26 PROBLEM — Z15.09 BRCA2 GENE MUTATION POSITIVE IN MALE: Status: ACTIVE | Noted: 2019-06-26

## 2019-06-26 PROBLEM — Z15.03 BRCA2 GENE MUTATION POSITIVE IN MALE: Status: ACTIVE | Noted: 2019-06-26

## 2022-05-24 PROBLEM — M54.41 CHRONIC RIGHT-SIDED LOW BACK PAIN WITH RIGHT-SIDED SCIATICA: Status: ACTIVE | Noted: 2022-05-24

## 2022-05-24 PROBLEM — G89.29 CHRONIC RIGHT-SIDED LOW BACK PAIN WITH RIGHT-SIDED SCIATICA: Status: ACTIVE | Noted: 2022-05-24
